# Patient Record
Sex: FEMALE | Race: WHITE
[De-identification: names, ages, dates, MRNs, and addresses within clinical notes are randomized per-mention and may not be internally consistent; named-entity substitution may affect disease eponyms.]

---

## 2019-02-17 ENCOUNTER — HOSPITAL ENCOUNTER (EMERGENCY)
Dept: HOSPITAL 89 - ER | Age: 24
Discharge: HOME | End: 2019-02-17
Payer: OTHER GOVERNMENT

## 2019-02-17 VITALS — SYSTOLIC BLOOD PRESSURE: 146 MMHG | DIASTOLIC BLOOD PRESSURE: 88 MMHG
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
## 2019-02-17 DIAGNOSIS — S52.615A: ICD-10-CM

## 2019-02-17 DIAGNOSIS — S52.125A: Primary | ICD-10-CM

## 2019-02-17 DIAGNOSIS — S52.592A: ICD-10-CM

## 2019-02-17 PROCEDURE — 73060 X-RAY EXAM OF HUMERUS: CPT

## 2019-02-17 PROCEDURE — 99284 EMERGENCY DEPT VISIT MOD MDM: CPT

## 2019-02-17 PROCEDURE — 73010 X-RAY EXAM OF SHOULDER BLADE: CPT

## 2019-02-17 PROCEDURE — 73564 X-RAY EXAM KNEE 4 OR MORE: CPT

## 2019-02-17 PROCEDURE — 96375 TX/PRO/DX INJ NEW DRUG ADDON: CPT

## 2019-02-17 PROCEDURE — 73090 X-RAY EXAM OF FOREARM: CPT

## 2019-02-17 PROCEDURE — 73110 X-RAY EXAM OF WRIST: CPT

## 2019-02-17 PROCEDURE — 25605 CLTX DST RDL FX/EPHYS SEP W/: CPT

## 2019-02-17 PROCEDURE — 73100 X-RAY EXAM OF WRIST: CPT

## 2019-02-17 PROCEDURE — 96374 THER/PROPH/DIAG INJ IV PUSH: CPT

## 2019-02-17 PROCEDURE — 73080 X-RAY EXAM OF ELBOW: CPT

## 2019-02-17 PROCEDURE — 73030 X-RAY EXAM OF SHOULDER: CPT

## 2019-02-17 NOTE — RADIOLOGY IMAGING REPORT
FACILITY: [facility] 

 

PATIENT NAME: Kiara Vidal

: 1995

MR: [id number]

V: [id number]

EXAM DATE: 

ORDERING PHYSICIAN: KURT RENTERIA

TECHNOLOGIST: 

 

Location: [facility]

Patient: Kiara Vidal

: 1995

MRN: [id number]

Visit/Account:[id number]

Date of Sevice:  2019

 

ACCESSION #: [id number]

 

Technique: XR WRIST 2 VWS LT

 

HISTORY: post reduction

 

Comparison studies:  Left wrist radiographs 2019

 

FINDINGS: Casting overlies the left wrist. Again noted is a comminuted, intra-articular fracture invo
lving the distal left radial metadiaphysis. Also noted is a nondisplaced ulna styloid fracture.

 

IMPRESSION:

1.  Status post casting of a comminuted, intra-articular distal left radial fracture.

 

Report Dictated By: Nilson Sanon DO at 2019 4:39 PM

 

Report E-Signed By: Nilson Sanon DO  at 2019 4:57 PM

 

[id number]
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
